# Patient Record
Sex: FEMALE
[De-identification: names, ages, dates, MRNs, and addresses within clinical notes are randomized per-mention and may not be internally consistent; named-entity substitution may affect disease eponyms.]

---

## 2018-04-19 ENCOUNTER — HOSPITAL ENCOUNTER (EMERGENCY)
Dept: HOSPITAL 97 - ER | Age: 68
Discharge: HOME | End: 2018-04-19
Payer: COMMERCIAL

## 2018-04-19 DIAGNOSIS — E78.00: ICD-10-CM

## 2018-04-19 DIAGNOSIS — I10: ICD-10-CM

## 2018-04-19 DIAGNOSIS — J20.9: Primary | ICD-10-CM

## 2018-04-19 PROCEDURE — 71046 X-RAY EXAM CHEST 2 VIEWS: CPT

## 2018-04-19 PROCEDURE — 99284 EMERGENCY DEPT VISIT MOD MDM: CPT

## 2018-04-19 PROCEDURE — 87804 INFLUENZA ASSAY W/OPTIC: CPT

## 2018-04-19 PROCEDURE — 87070 CULTURE OTHR SPECIMN AEROBIC: CPT

## 2018-04-19 PROCEDURE — 87081 CULTURE SCREEN ONLY: CPT

## 2018-04-19 NOTE — RAD REPORT
EXAM DESCRIPTION:  RAD - Chest Pa And Lat (2 Views) - 4/19/2018 6:56 pm

 

CLINICAL HISTORY:  Cough

 

COMPARISON:  10/30/2017

 

FINDINGS:  The lungs are clear. The heart is normal in size. No displaced fractures.

 

IMPRESSION:  No acute or concerning finding suspected.

## 2018-04-19 NOTE — ER
Nurse's Notes                                                                                     

 [facility]                                                                

Name: Ana Goodrich                                                                              

Age: 67 yrs                                                                                       

Sex: Female                                                                                       

: 1950                                                                                   

MRN: [id number]                                                                                   

Arrival Date: 2018                                                                          

Time: 18:02                                                                                       

Account#: [id number]                                                                            

Bed 17                                                                                            

Private MD:                                                                                       

Diagnosis: Acute bronchitis                                                                       

                                                                                                  

Presentation:                                                                                     

                                                                                             

18:09 Presenting complaint: Patient states: Painful cough since . Denies fever.         aj  

      Transition of care: patient was not received from another setting of care. Onset of         

      symptoms was April 15, 2018. Initial Sepsis Screen: Does the patient meet any 2             

      criteria? No. Patient's initial sepsis screen is negative. Does the patient have a          

      suspected source of infection? No. Patient's initial sepsis screen is negative. Care        

      prior to arrival: None.                                                                     

18:09 Method Of Arrival: Ambulatory                                                           aj  

18:09 Acuity: SABINA 4                                                                           aj  

                                                                                                  

Triage Assessment:                                                                                

18:11 General: Appears in no apparent distress. comfortable, Behavior is calm, cooperative,   aj  

      appropriate for age. Pain: Denies pain. Neuro: Level of Consciousness is awake, alert,      

      obeys commands, Oriented to person, place, time, situation, Appropriate for age.            

      Respiratory: Reports pain with cough Airway is patent Respiratory effort is even,           

      unlabored, Respiratory pattern is regular, symmetrical. Derm: Skin is intact, is            

      healthy with good turgor, Skin is pink, warm \T\ dry. normal.                               

                                                                                                  

Historical:                                                                                       

- Allergies:                                                                                      

18:11 No Known Allergies;                                                                     aj  

- Home Meds:                                                                                      

18:11 Atenolol Oral [Active]; hydrochlorothiazide 25 mg Oral tab 1 tab once daily [Active];   aj  

      pravastatin 40 mg Oral tab 1 tab once daily [Active];                                       

- PMHx:                                                                                           

18:11 High Cholesterol; Hypertension;                                                         aj  

- PSHx:                                                                                           

18:11 None;                                                                                   aj  

                                                                                                  

- Immunization history:: Adult Immunizations up to date.                                          

- Social history:: Smoking status: Patient/guardian denies using tobacco.                         

                                                                                                  

                                                                                                  

Screenin:57 Abuse screen: Denies threats or abuse. Denies injuries from another. Nutritional        lk1 

      screening: No deficits noted. Tuberculosis screening: No symptoms or risk factors           

      identified. Fall Risk None identified.                                                      

                                                                                                  

Assessment:                                                                                       

18:56 General: Appears in no apparent distress. Behavior is calm, cooperative, appropriate    lk1 

      for age. Pain: Denies pain. Neuro: Level of Consciousness is awake, alert, obeys            

      commands, Oriented to person, place, time, situation. Cardiovascular: Capillary refill      

      is brisk Patient's skin is warm and dry. Respiratory: Airway is patent Respiratory          

      effort is even, unlabored, Respiratory pattern is regular, symmetrical, Breath sounds       

      are clear bilaterally. Respiratory: the patient has mild shortness of breath                

      Parent/caregiver reports the patient having cough that is dry, hacking, persistent. GI:     

      No signs and/or symptoms were reported involving the gastrointestinal system. : No        

      signs and/or symptoms were reported regarding the genitourinary system. EENT: No signs      

      and/or symptoms were reported regarding the EENT system. Derm: No signs and/or symptoms     

      reported regarding the dermatologic system. Musculoskeletal: No signs and/or symptoms       

      reported regarding the musculoskeletal system.                                              

19:30 Reassessment: Patient and/or family updated on plan of care and expected duration. Pain lk1 

      level reassessed. Patient is alert, oriented x 3, equal unlabored respirations, skin        

      warm/dry/pink. Patient states symptoms have improved. Critical care time stopped,           

      patient has stabilized. Respiratory: Breath sounds are clear bilaterally.                   

                                                                                                  

Vital Signs:                                                                                      

18:11  / 72; Pulse 69; Resp 17; Temp 98.9; Pulse Ox 98% on R/A; Weight 68.04 kg; Height aj  

      5 ft. 2 in. (157.48 cm); Pain 0/10;                                                         

19:30  / 72; Pulse 74; Resp 16; Pulse Ox 99% on R/A;                                    lk1 

18:11 Body Mass Index 27.44 (68.04 kg, 157.48 cm)                                               

                                                                                                  

ED Course:                                                                                        

18:02 Patient arrived in ED.                                                                  mr  

18:11 Triage completed.                                                                       aj  

18:11 Arm band placed on left wrist. Patient placed in an exam room.                          aj  

18:13 Torey Hill PA is PHCP.                                                                cp  

18:13 Torey Cooney MD is Attending Physician.                                             cp  

18:27 Nehal Morales, NAVID is Primary Nurse.                                                       lk1 

18:52 Flu and/or RSV swab sent to lab. Strep swab sent to lab.                                lk1 

18:53 Patient moved to radiology via wheelchair.                                              kc2 

18:54 XRAY Chest Pa And Lat (2 Views) In Process Unspecified.                                 EDMS

18:55 X-ray completed. Patient tolerated procedure well. Patient moved back from radiology.   kc2 

19:03 Patient has correct armband on for positive identification. Bed in low position. Call   lk1 

      light in reach. Adult w/ patient.                                                           

19:03 No provider procedures requiring assistance completed. Patient did not have IV access   lk1 

      during this emergency room visit.                                                           

                                                                                                  

Administered Medications:                                                                         

18:45 Drug: Tessalon Perle 200 mg Route: PO;                                                  lk1 

19:05 Follow up: Response: No adverse reaction; Marked relief of symptoms                     lk1 

18:58 Drug: Albuterol 2.5 mg Route: Inhalation;                                               lk1 

                                                                                                  

                                                                                                  

Outcome:                                                                                          

19:26 Discharge ordered by MD.                                                                cp  

19:45 Discharged to home ambulatory, with family.                                             lk1 

19:45 Condition: stable                                                                           

19:45 Discharge instructions given to patient, family, Instructed on discharge instructions,      

      follow up and referral plans. medication usage, safety practices, Demonstrated              

      understanding of instructions, follow-up care, medications, Prescriptions given X 3.        

19:45 Patient left the ED.                                                                    lk1 

                                                                                                  

Signatures:                                                                                       

Dispatcher [employer]                           EDMS                                                 

Nancy Giles RN RN aj Rivera, Maria mr Page, Corey, PA PA cp Kluge, Leah, RN RN   lk1                                                  

Diamond Ryan2                                                  

                                                                                                  

**************************************************************************************************

## 2018-04-19 NOTE — EDPHYS
Physician Documentation                                                                           

 [facility]                                                                

Name: Ana Goodrich                                                                              

Age: 67 yrs                                                                                       

Sex: Female                                                                                       

: 1950                                                                                   

MRN: [id number]                                                                                   

Arrival Date: 2018                                                                          

Time: 18:02                                                                                       

Account#: [id number]                                                                            

Bed 17                                                                                            

Private MD:                                                                                       

ED Physician Torey Cooney                                                                      

HPI:                                                                                              

                                                                                             

18:25 This 67 yrs old  Female presents to ER via Ambulatory with complaints of Cough. cp  

18:25 The patient or guardian reports cough, that is intermittent.                            cp  

18:25 Onset: The symptoms/episode began/occurred 4 day(s) ago. Severity of symptoms: in the     

      emergency department the symptoms are unchanged, despite home interventions. Associated     

      signs and symptoms: Pertinent negatives: chest pain, diarrhea, fever, vomiting.             

                                                                                                  

Historical:                                                                                       

- Allergies:                                                                                      

18:11 No Known Allergies;                                                                     aj  

- Home Meds:                                                                                      

18:11 Atenolol Oral [Active]; hydrochlorothiazide 25 mg Oral tab 1 tab once daily [Active];   aj  

      pravastatin 40 mg Oral tab 1 tab once daily [Active];                                       

- PMHx:                                                                                           

18:11 High Cholesterol; Hypertension;                                                         aj  

- PSHx:                                                                                           

18:11 None;                                                                                   aj  

                                                                                                  

- Immunization history:: Adult Immunizations up to date.                                          

- Social history:: Smoking status: Patient/guardian denies using tobacco.                         

                                                                                                  

                                                                                                  

ROS:                                                                                              

18:30 Constitutional: Negative for body aches, chills, fever, poor PO intake.                 cp  

18:30 Eyes: Negative for injury, pain, redness, and discharge.                                cp  

18:30 ENT: Positive for sore throat, Negative for drainage from ear(s), ear pain, difficulty  cp  

      swallowing, difficulty handling secretions, hoarseness.                                     

18:30 Cardiovascular: Negative for chest pain, edema, palpitations.                           cp  

18:30 Respiratory: Positive for cough, "sounds productive", Negative for shortness of breath,     

      wheezing.                                                                                   

18:30 Abdomen/GI: Negative for abdominal pain, vomiting, diarrhea, constipation.                  

18:30 Back: Negative for pain at rest, pain with movement, radiated pain.                         

18:30 : Negative for urinary symptoms.                                                          

18:30 Skin: Negative for cellulitis, rash.                                                        

18:30 Neuro: Negative for altered mental status, headache, syncope, near syncope, weakness.       

18:30 All other systems are negative.                                                             

                                                                                                  

Exam:                                                                                             

18:37 Constitutional: The patient appears in no acute distress, alert, awake,                 cp  

      non-diaphoretic, non-toxic, well developed, well nourished.                                 

18:37 Head/Face:  Normocephalic, atraumatic.                                                  cp  

18:37 Eyes: Periorbital structures: appear normal, Pupils: equal, round, and reactive to          

      light and accomodation, Extraocular movements: intact throughout, Conjunctiva: normal,      

      no exudate, no injection, Sclera: no appreciated abnormality, Lids and lashes: appear       

      normal, bilaterally.                                                                        

18:37 ENT: External ear(s): are unremarkable, Ear canal(s): are normal, clear, TM's: bulging,     

      is not appreciated, bilaterally, dullness, bilaterally, erythema, is not appreciated,       

      bilaterally, Nose: is normal, Mouth: Lips: moist, Oral mucosa: moist, Posterior             

      pharynx: Airway: no evidence of obstruction, patent, Tonsils: are normal in appearance,     

      Uvula: midline, swelling, is not appreciated, erythema, that is mild, exudate, is not       

      appreciated.                                                                                

18:37 Neck: ROM/movement: is normal, is supple, without pain, no range of motions                 

      limitations, no meningismus, no nuchal rigidity, Lymph nodes: no appreciated                

      lymphadenopathy.                                                                            

18:37 Chest/axilla: Inspection: normal, Palpation: is normal, no crepitus, no tenderness.         

18:37 Cardiovascular: Rate: normal, Rhythm: regular, Edema: is not appreciated, JVD: is not       

      appreciated.                                                                                

18:37 Respiratory: the patient does not display signs of respiratory distress,  Respirations:     

      normal, no use of accessory muscles, no retractions, no splinting, no tachypnea,            

      labored breathing, is not present, Breath sounds: bronchial sounds, that are mild, are      

      heard diffusely, decreased breath sounds, are not appreciated, stridor, is not              

      appreciated, wheezing: is not appreciated.                                                  

18:37 Abdomen/GI: Exam negative for discomfort, distension, guarding, Inspection: abdomen         

      appears normal.                                                                             

18:37 Skin: cellulitis, is not appreciated, no rash present.                                      

                                                                                                  

Vital Signs:                                                                                      

18:11  / 72; Pulse 69; Resp 17; Temp 98.9; Pulse Ox 98% on R/A; Weight 68.04 kg; Height aj  

      5 ft. 2 in. (157.48 cm); Pain 0/10;                                                         

19:30  / 72; Pulse 74; Resp 16; Pulse Ox 99% on R/A;                                    lk1 

18:11 Body Mass Index 27.44 (68.04 kg, 157.48 cm)                                             aj  

                                                                                                  

MDM:                                                                                              

18:13 Patient medically screened.                                                             art 

19:00 Differential Diagnosis: Bronchitis Influenza Upper Respiratory Infection Pneumonia.     cp  

19:20 Data reviewed: vital signs, nurses notes, lab test result(s), radiologic studies, plain cp  

      films.                                                                                      

19:20 Test interpretation: by ED physician or midlevel provider: plain radiologic studies.    cp  

19:25 Counseling: I had a detailed discussion with the patient and/or guardian regarding: the cp  

      historical points, exam findings, and any diagnostic results supporting the                 

      discharge/admit diagnosis, lab results, radiology results, the need for outpatient          

      follow up, a family practitioner, to return to the emergency department if symptoms         

      worsen or persist or if there are any questions or concerns that arise at home.             

19:25 Response to treatment: the patient's symptoms have mildly improved after treatment, and cp  

      as a result, I will discharge patient.                                                      

                                                                                                  

                                                                                             

18:24 Order name: Influenza Screen (a \T\ B)                                                    

                                                                                             

18:24 Order name: Strep                                                                         

                                                                                             

18:24 Order name: XRAY Chest Pa And Lat (2 Views); Complete Time: 19:14                       cp  

                                                                                             

19:14 Interpretation: Report reviewed.                                                        cp  

                                                                                                  

Administered Medications:                                                                         

18:45 Drug: Tessalon Perle 200 mg Route: PO;                                                  lk1 

19:05 Follow up: Response: No adverse reaction; Marked relief of symptoms                     lk1 

18:58 Drug: Albuterol 2.5 mg Route: Inhalation;                                               lk1 

                                                                                                  

                                                                                                  

Disposition:                                                                                      

18 19:26 Discharged to Home. Impression: Acute bronchitis.                                  

- Condition is Stable.                                                                            

- Discharge Instructions: Acute Bronchitis.                                                       

- Prescriptions for Prednisone 20 mg Oral Tablet - take 1 tablet by ORAL route once               

  daily for 5 days; 5 tablet. Tessalon Perles 100 mg Oral Capsule - take 1 capsule by             

  ORAL route every 8 hours As needed; 15 capsule. Albuterol Sulfate 90 mcg/actuation -            

  inhale 1-2 puff by INHALATION route every 4-6 hours; 1 Inhaler.                                 

- Medication Reconciliation Form, Thank You Letter, Antibiotic Education, Prescription            

  Opioid Use form.                                                                                

- Follow up: Private Physician; When: 2 - 3 days; Reason: Recheck today's complaints.             

- Problem is new.                                                                                 

- Symptoms have improved.                                                                         

                                                                                                  

                                                                                                  

                                                                                                  

Addendum:                                                                                         

2018                                                                                        

     07:23 Co-signature as Attending Physician, Torey Cooney MD I agree with the assessment and  c
ha

           plan of care.                                                                          

                                                                                                  

Signatures:                                                                                       

Dispatcher [employer]                           Nancy Barrios, RN                       RN   Torey Giraldo MD MD cha Page, Corey, PA PA cp Kluge, Leah, RN                         RN   lk1                                                  

                                                                                                  

**************************************************************************************************

## 2021-06-02 ENCOUNTER — HOSPITAL ENCOUNTER (EMERGENCY)
Dept: HOSPITAL 97 - ER | Age: 71
Discharge: HOME | End: 2021-06-02
Payer: COMMERCIAL

## 2021-06-02 VITALS — DIASTOLIC BLOOD PRESSURE: 78 MMHG | OXYGEN SATURATION: 98 % | SYSTOLIC BLOOD PRESSURE: 193 MMHG

## 2021-06-02 DIAGNOSIS — I10: ICD-10-CM

## 2021-06-02 DIAGNOSIS — H11.31: Primary | ICD-10-CM

## 2021-06-02 DIAGNOSIS — E78.00: ICD-10-CM

## 2021-06-02 PROCEDURE — 99281 EMR DPT VST MAYX REQ PHY/QHP: CPT

## 2021-06-02 NOTE — ER
Nurse's Notes                                                                                     

 [facility]                                                                 

Name: Ana Goodrich                                                                              

Age: 70 yrs                                                                                       

Sex: Female                                                                                       

: 1950                                                                                   

MRN: [id number]                                                                                   

Arrival Date: 2021                                                                          

Time: 18:46                                                                                       

Account#: [id number]                                                                            

Bed Treatment                                                                                     

Private MD:                                                                                       

Diagnosis: Conjunctival hemorrhage, right eye                                                     

                                                                                                  

Presentation:                                                                                     

                                                                                             

19:24 Chief complaint: Patient states: redness to right eye since 11 am, no trauma , no       iw  

      vision loss, is not painful. Coronavirus screen: At this time, the client does not          

      indicate any symptoms associated with coronavirus-19. Ebola Screen: Patient negative        

      for fever greater than or equal to 101.5 degrees Fahrenheit, and additional compatible      

      Ebola Virus Disease symptoms Patient denies exposure to infectious person. Patient          

      denies travel to an Ebola-affected area in the 21 days before illness onset. No             

      symptoms or risks identified at this time. Initial Sepsis Screen: Does the patient meet     

      any 2 criteria? No. Patient's initial sepsis screen is negative. Does the patient have      

      a suspected source of infection? No. Patient's initial sepsis screen is negative. Risk      

      Assessment: Do you want to hurt yourself or someone else? Patient reports no desire to      

      harm self or others. Onset of symptoms was 2021.                                   

19:24 Method Of Arrival: Ambulatory                                                           iw  

19:24 Acuity: SABINA 3                                                                           iw  

                                                                                                  

Historical:                                                                                       

- Allergies:                                                                                      

19:28 No Known Allergies;                                                                     iw  

- Home Meds:                                                                                      

19:28 losartan-hydrochlorothiazide 50-12.5 mg oral tab 1 tab once daily [Active];             iw  

- PMHx:                                                                                           

19:28 High Cholesterol; Hypertension;                                                         iw  

                                                                                                  

- Family history:: not pertinent.                                                                 

- Hospitalizations: : No recent hospitalization is reported.                                      

                                                                                                  

                                                                                                  

Vital Signs:                                                                                      

19:24  / 78; Pulse 84; Resp 16; Pulse Ox 98% on R/A;                                    iw  

                                                                                                  

ED Course:                                                                                        

18:46 Patient arrived in ED.                                                                  as  

19:27 Triage completed.                                                                       iw  

19:59 Barb Chery RN is Primary Nurse.                                                   iw  

19:59 Arm band placed on.                                                                     iw  

20:12 Stanislav Pérez MD is Attending Physician.                                                rn  

                                                                                                  

Administered Medications:                                                                         

No medications were administered                                                                  

                                                                                                  

                                                                                                  

Outcome:                                                                                          

20:23 Discharge ordered by MD.                                                                rn  

20:32 Patient left the ED.                                                                    iw  

                                                                                                  

Signatures:                                                                                       

Heather Foster                             as                                                   

Barb Chery RN                     RN   iw                                                   

Stanislav Pérez MD MD   rn                                                   

                                                                                                  

Corrections: (The following items were deleted from the chart)                                    

19:59 19:24 Acuity: SABINA 4 iw                                                                  iw  

                                                                                                  

**************************************************************************************************

## 2021-06-02 NOTE — EDPHYS
Physician Documentation                                                                           

 [facility]                                                                 

Name: Ana Goodrich                                                                              

Age: 70 yrs                                                                                       

Sex: Female                                                                                       

: 1950                                                                                   

MRN: [id number]                                                                                   

Arrival Date: 2021                                                                          

Time: 18:46                                                                                       

Account#: [id number]                                                                            

Bed Treatment                                                                                     

Private MD:                                                                                       

ED Physician Stanislav Pérez                                                                         

HPI:                                                                                              

                                                                                             

20:19 This 70 yrs old  Female presents to ER via Ambulatory with complaints of        rn  

      Redness of Eye, High Blood Pressure.                                                        

20:19 The patient is experiencing redness, The patient sustained None. to the right eye,      rn  

      caused by an unknown mechanism. Onset: The symptoms/episode began/occurred this             

      morning. Duration: the symptoms are continuous. Aggravated by nothing. Alleviated by        

      nothing. Severity of symptoms: At their worst the symptoms were mild in the emergency       

      department the symptoms are unchanged. The patient has not experienced similar symptoms     

      in the past. The patient has not recently seen a physician. Woke up today with redness      

      of white of right eye, denies trauma or splash injury, no pain, no vision changes, no       

      headache, daughter checked BP and was elevated so brought her in. No other complaints. .    

                                                                                                  

Historical:                                                                                       

- Allergies:                                                                                      

19:28 No Known Allergies;                                                                     iw  

- Home Meds:                                                                                      

19:28 losartan-hydrochlorothiazide 50-12.5 mg oral tab 1 tab once daily [Active];             iw  

- PMHx:                                                                                           

19:28 High Cholesterol; Hypertension;                                                         iw  

                                                                                                  

- Family history:: not pertinent.                                                                 

- Hospitalizations: : No recent hospitalization is reported.                                      

                                                                                                  

                                                                                                  

ROS:                                                                                              

20:19 Constitutional: Negative for fever, chills, and weight loss, Eyes: Negative for injury, rn  

      pain, and discharge, Neuro: Negative for headache, weakness, numbness, tingling, and        

      seizure.                                                                                    

                                                                                                  

Exam:                                                                                             

20:19 Constitutional:  This is a well developed, well nourished patient who is awake, alert,  rn  

      and in no acute distress. Head/Face:  Normocephalic, atraumatic. Eyes:  Pupils equal        

      round and reactive to light, extra-ocular motions intact.  Lids and lashes normal.          

      Cornea within normal limits.  Periorbital areas with no swelling, redness, or edema.  +     

      subconjunctival hemorrhage right eye. No foreign body identified. Neuro:  Awake and         

      alert, GCS 15, oriented to person, place, time, and situation.  Cranial nerves II-XII       

      grossly intact.  Motor strength 5/5 in all extremities.  Sensory grossly intact.            

      Cerebellar exam normal.  Normal gait.                                                       

                                                                                                  

Vital Signs:                                                                                      

19:24  / 78; Pulse 84; Resp 16; Pulse Ox 98% on R/A;                                    iw  

                                                                                                  

MDM:                                                                                              

20:12 Patient medically screened.                                                             rn  

20:19 Differential diagnosis: Subconjunctival hemorrhage. Data reviewed: vital signs, nurses  rn  

      notes, and as a result, I will discharge patient. Counseling: I had a detailed              

      discussion with the patient and/or guardian regarding: the historical points, exam          

      findings, and any diagnostic results supporting the discharge/admit diagnosis, the need     

      for outpatient follow up, to return to the emergency department if symptoms worsen or       

      persist or if there are any questions or concerns that arise at home. Counseling: I had     

      a detailed discussion with the patient and/or guardian regarding: the presence of at        

      least one elevated blood pressure reading (>120/80) during this emergency department        

      visit. Medical screen evaluation completed. [facility] emergency medical condition absent.      

      Special discussion: I have referred the patient to see his PCP for further evaluation       

      of high blood pressure. I discussed with the patient/guardian in detail that at this        

      point there is no indication for admission to the hospital. It is understood, however,      

      that if the symptoms persist or worsen the patient needs to return immediately for          

      re-evaluation. Based on the history and exam findings, there is no indication for           

      further emergent testing or inpatient evaluation. I discussed with the patient/guardian     

      the need to see the primary care provider for further evaluation of the symptoms.           

                                                                                                  

Administered Medications:                                                                         

No medications were administered                                                                  

                                                                                                  

                                                                                                  

Disposition:                                                                                      

21 20:23 Discharged to Home. Impression: Conjunctival hemorrhage, right eye.                

- Condition is Stable.                                                                            

- Discharge Instructions: Subconjunctival Hemorrhage.                                             

                                                                                                  

- Medication Reconciliation Form, Thank You Letter, Antibiotic Education, Prescription            

  Opioid Use form.                                                                                

- Follow up: Private Physician; When: As needed; Reason: Recheck today's complaints,              

  Re-evaluation by your physician.                                                                

- Problem is new.                                                                                 

- Symptoms are unchanged.                                                                         

                                                                                                  

                                                                                                  

                                                                                                  

Signatures:                                                                                       

Barb Chery RN                     RN   iw                                                   

Stanislav Pérez MD MD   rn                                                   

                                                                                                  

Corrections: (The following items were deleted from the chart)                                    

20:32 20:23 2021 20:23 Discharged to Home. Impression: Conjunctival hemorrhage, right   iw  

      eye. Condition is Stable. Forms are Medication Reconciliation Form, Thank You Letter,       

      Antibiotic Education, Prescription Opioid Use. Follow up: Private Physician; When: As       

      needed; Reason: Recheck today's complaints, Re-evaluation by your physician. Problem is     

      new. Symptoms are unchanged. rn                                                             

                                                                                                  

**************************************************************************************************

## 2021-10-12 ENCOUNTER — HOSPITAL ENCOUNTER (EMERGENCY)
Dept: HOSPITAL 97 - ER | Age: 71
Discharge: HOME | End: 2021-10-12
Payer: COMMERCIAL

## 2021-10-12 VITALS — DIASTOLIC BLOOD PRESSURE: 100 MMHG | SYSTOLIC BLOOD PRESSURE: 180 MMHG | OXYGEN SATURATION: 99 % | TEMPERATURE: 99 F

## 2021-10-12 DIAGNOSIS — E78.00: ICD-10-CM

## 2021-10-12 DIAGNOSIS — N39.0: Primary | ICD-10-CM

## 2021-10-12 DIAGNOSIS — I10: ICD-10-CM

## 2021-10-12 PROCEDURE — 99283 EMERGENCY DEPT VISIT LOW MDM: CPT

## 2021-10-12 PROCEDURE — 81003 URINALYSIS AUTO W/O SCOPE: CPT

## 2021-10-12 PROCEDURE — 87086 URINE CULTURE/COLONY COUNT: CPT

## 2021-10-12 PROCEDURE — 87088 URINE BACTERIA CULTURE: CPT

## 2021-10-12 NOTE — EDPHYS
Physician Documentation                                                                           

 [facility]                                                                 

Name: Ana Goodrich                                                                              

Age: 71 yrs                                                                                       

Sex: Female                                                                                       

: 1950                                                                                   

MRN: [id number]                                                                                   

Arrival Date: 10/12/2021                                                                          

Time: 07:08                                                                                       

Account#: [id number]                                                                            

Bed 14                                                                                            

Private MD:                                                                                       

PILAR Physician Torey Cooney                                                                      

HPI:                                                                                              

10/12                                                                                             

07:22 This 71 yrs old  Female presents to ER via Ambulatory with complaints of        jmm 

      Abdominal Pain, Blood in Urine.                                                             

07:22 The patient presents with abdominal pain suprapubic. Onset: The symptoms/episode        jmm 

      began/occurred gradually, 1 day(s) ago. The symptoms do not radiate. Associated signs       

      and symptoms: Pertinent positives: dysuria, hematuria, Pertinent negatives: nausea and      

      vomiting, chest pain, diarrhea, fever, vaginal discharge, vomiting. The symptoms are        

      described as achy. Modifying factors: The symptoms are alleviated by nothing, the           

      symptoms are aggravated by urinating. It is unknown whether or not the patient has had      

      similar symptoms in the past.                                                               

                                                                                                  

Historical:                                                                                       

- Home Meds:                                                                                      

07:20 hydrochlorothiazide 25 mg Oral tab 1 tab once daily [Active];                           es2 

      losartan-hydrochlorothiazide 50-12.5 mg Oral tab 1 tab once daily [Active]; pravastatin     

      40 mg Oral tab 1 tab once daily [Active];                                                   

- PMHx:                                                                                           

07:20 High Cholesterol; Hypertension;                                                         es2 

                                                                                                  

- Immunization history:: Client reports receiving the 2nd dose of the Covid vaccine.              

- Social history:: Smoking status: Patient denies any tobacco usage or history of.                

                                                                                                  

                                                                                                  

ROS:                                                                                              

07:22 Constitutional: Negative for fever, chills, and weight loss, Cardiovascular: Negative   jmm 

      for chest pain, palpitations, and edema, Respiratory: Negative for shortness of breath,     

      cough, wheezing, and pleuritic chest pain.                                                  

07:22 Abdomen/GI: Positive for abdominal pain.                                                    

07:22 : Positive for urinary symptoms, hematuria, burning with urination.                       

07:22 All other systems are negative.                                                             

                                                                                                  

Exam:                                                                                             

07:22 Constitutional:  This is a well developed, well nourished patient who is awake, alert,  jmm 

      and in no acute distress. Head/Face:  atraumatic. Eyes:  EOMI, no conjunctival erythema     

      appreciated ENT:  Moist Mucus Membranes Neck:  Trachea midline, Supple Chest/axilla:        

      Normal chest wall appearance and motion.   Cardiovascular:  Regular rate and rhythm.        

      No edema appreciated Respiratory:  Normal respirations, no respiratory distress             

      appreciated                                                                                 

07:22 Skin:  General appearance color normal MS/ Extremity:  Moves all extremities, no            

      obvious deformities appreciated, no edema noted to the lower extremities  Neuro:  Awake     

      and alert, normal gait Psych:  Behavior is normal, Mood is normal, Patient is               

      cooperative and pleasant                                                                    

07:22 Abdomen/GI: Inspection: abdomen appears normal, Bowel sounds: normal, Palpation: soft,      

      mild abdominal tenderness, in the suprapubic area.                                          

                                                                                                  

Vital Signs:                                                                                      

07:23  / 100; Pulse 104; Resp 18; Temp 99; Pulse Ox 99% on R/A;                         es2 

                                                                                                  

MDM:                                                                                              

07:22 Patient medically screened.                                                             [facility] 

07:32 Data reviewed: vital signs, nurses notes. Counseling: I had a detailed discussion with  jagdish 

      the patient and/or guardian regarding: the historical points, exam findings, and any        

      diagnostic results supporting the discharge/admit diagnosis, lab results, the need for      

      outpatient follow up, to return to the emergency department if symptoms worsen or           

      persist or if there are any questions or concerns that arise at home. ED course:            

      Patient is alert and nontoxic in appearance in the ED. No flank pain, vomiting. I do        

      not currently suspect calculus of ureter. Patient will be treated with oral                 

      antibiotics. I do not currently suspect appendicitis or diverticulitis. Patient is          

      otherwise given strict return precautions. Patient understood and agrees to plan of         

      care..                                                                                      

                                                                                                  

10/12                                                                                             

07:22 Order name: Urine Culture                                                               [facility] 

10/12                                                                                             

07:22 Order name: Urine Culture                                                               [facility]

10/12                                                                                             

07:22 Order name: Urine Dipstick-Ancillary (obtain specimen); Complete Time: 07:40            [facility] 

10/12                                                                                             

07:25 Order name: Urine Dipstick-Ancillary; Complete Time: 07:29                              EDMS

                                                                                                  

Administered Medications:                                                                         

No medications were administered                                                                  

                                                                                                  

                                                                                                  

Disposition:                                                                                      

08:32 Co-signature as Attending Physician, Torey Cooney MD I agree with the assessment and  art 

      plan of care.                                                                               

                                                                                                  

Disposition Summary:                                                                              

10/12/21 07:34                                                                                    

Discharge Ordered                                                                                 

      Location: Home                                                                          [facility] 

      Condition: Stable                                                                       [facility] 

      Diagnosis                                                                                   

        - UTI/ Urinary tract infection, site not specified                                    [facility] 

      Followup:                                                                               jagdish 

        - With: Private Physician                                                                  

        - When: 2 - 3 days                                                                         

        - Reason: Recheck today's complaints, Continuance of care, Re-evaluation by your           

      physician                                                                                   

      Discharge Instructions:                                                                     

        - Discharge Summary Sheet                                                             [facility] 

        - Urinary Tract Infection, Adult                                                      [facility] 

      Forms:                                                                                      

        - Medication Reconciliation Form                                                      jmm 

        - Thank You Letter                                                                    jmchance 

        - Antibiotic Education                                                                [facility] 

        - Prescription Opioid Use                                                             [facility] 

      Prescriptions:                                                                              

        - Cipro 500 mg Oral Tablet                                                                 

            - take 1 tablet by ORAL route every 12 hours for 7 days; 20 tablet; Refills: 0,   jmm 

      Product Selection Permitted                                                                 

Signatures:                                                                                       

Dispatcher [employer]                           Torey Moreno MD MD cha Mickail, Joel, PA PA jmm Smith, Elizabeth RN                    RN   es2                                                  

                                                                                                  

**************************************************************************************************

## 2021-10-12 NOTE — ER
Nurse's Notes                                                                                     

 [facility]                                                                 

Name: Ana Goodrich                                                                              

Age: 71 yrs                                                                                       

Sex: Female                                                                                       

: 1950                                                                                   

MRN: [id number]                                                                                   

Arrival Date: 10/12/2021                                                                          

Time: 07:08                                                                                       

Account#: [id number]                                                                            

Bed 14                                                                                            

Private MD:                                                                                       

Diagnosis: UTI/ Urinary tract infection, site not specified                                       

                                                                                                  

Presentation:                                                                                     

10/12                                                                                             

07:15 Chief complaint: Patient states: suprapubic discomfort and blood in urine that began    ss  

      yesterday.                                                                                  

07:15 Method Of Arrival: Ambulatory                                                           ss  

07:23 Coronavirus screen: Vaccine status: Patient reports receiving the 2nd dose of the covid es2 

      vaccine. Ebola Screen: Patient negative for fever greater than or equal to 101.5            

      degrees Fahrenheit, and additional compatible Ebola Virus Disease symptoms Patient          

      denies exposure to infectious person. Patient denies travel to an Ebola-affected area       

      in the 21 days before illness onset. No symptoms or risks identified at this time.          

      Initial Sepsis Screen: Does the patient meet any 2 criteria? No. Patient's initial          

      sepsis screen is negative. Does the patient have a suspected source of infection? No.       

      Patient's initial sepsis screen is negative. Risk Assessment: Do you want to hurt           

      yourself or someone else? Patient reports no desire to harm self or others. Onset of        

      symptoms was 2021.                                                              

07:23 Acuity: SABINA 3                                                                           es2 

                                                                                                  

Triage Assessment:                                                                                

07:21 General: Appears well groomed, well developed, Behavior is calm, cooperative,           es2 

      appropriate for age. Pain: Complains of pain in abdomen Pain currently is 9 out of 10       

      on a pain scale. EENT: No signs and/or symptoms were reported regarding the EENT            

      system. Neuro: Level of Consciousness is awake, alert, obeys commands, Oriented to          

      person, place, time, situation, Appropriate for age Gait is steady, Speech is normal.       

      Cardiovascular: Capillary refill < 3 seconds Patient's skin is warm and dry.                

      Respiratory: Airway is patent Respiratory effort is even, Respiratory pattern is            

      regular. GI: Abd is soft Abd is non tender. : Reports burning with urination, since       

      yesterday. Derm: No signs and/or symptoms reported regarding the dermatologic system.       

      Musculoskeletal: No signs and/or symptoms reported regarding the musculoskeletal system.    

                                                                                                  

Historical:                                                                                       

- Home Meds:                                                                                      

07:20 hydrochlorothiazide 25 mg Oral tab 1 tab once daily [Active];                           es2 

      losartan-hydrochlorothiazide 50-12.5 mg Oral tab 1 tab once daily [Active]; pravastatin     

      40 mg Oral tab 1 tab once daily [Active];                                                   

- PMHx:                                                                                           

07:20 High Cholesterol; Hypertension;                                                         es2 

                                                                                                  

- Immunization history:: Client reports receiving the 2nd dose of the Covid vaccine.              

- Social history:: Smoking status: Patient denies any tobacco usage or history of.                

                                                                                                  

                                                                                                  

Screenin:24 Abuse screen: Denies threats or abuse. Denies injuries from another. Nutritional        es2 

      screening: No deficits noted. Tuberculosis screening: No symptoms or risk factors           

      identified. Fall Risk Gait- Normal/Bed Rest/Wheelchair (0 pts) Mental Status- Oriented      

      to own ability (0 pts).                                                                     

                                                                                                  

Assessment:                                                                                       

07:26 Reassessment: Patient is alert, oriented x 3, equal unlabored respirations, skin        es2 

      warm/dry/pink. Pt in for lower abd, suprapubic pain that started yesterday with painful     

      urination, and blood in urine. GI: Bowel sounds present X 4 quads.                          

                                                                                                  

Vital Signs:                                                                                      

07:23  / 100; Pulse 104; Resp 18; Temp 99; Pulse Ox 99% on R/A;                         es2 

                                                                                                  

ED Course:                                                                                        

07:08 Patient arrived in ED.                                                                  bp1 

07:14 Abbey Damico, RN is Primary Nurse.                                                  es2 

07:16 Tony Moody PA is PHCP.                                                              jmm 

07:16 Torey Cooney MD is Attending Physician.                                             jmm 

07:24 Triage completed.                                                                       es2 

07:25 No provider procedures requiring assistance completed.                                  es2 

07:25 Patient has correct armband on for positive identification. Placed in gown. Bed in low  es2 

      position. Call light in reach. Side rails up X 1.                                           

07:25 Arm band placed on.                                                                     es2 

07:40 Urine Culture Sent.                                                                     es2 

07:40 Urine Culture Sent.                                                                     es2 

07:48 Patient did not have IV access during this emergency room visit.                        es2 

                                                                                                  

Administered Medications:                                                                         

No medications were administered                                                                  

                                                                                                  

                                                                                                  

Outcome:                                                                                          

07:34 Discharge ordered by MD.                                                                [facility] 

07:48 Discharged to home ambulatory.                                                          es2 

07:48 Condition: stable                                                                           

07:48 Discharge instructions given to patient, family, Instructed on discharge instructions,      

      medication usage, Demonstrated understanding of instructions, medications,                  

      Prescriptions given X 1.                                                                    

07:48 Patient left the ED.                                                                    es2 

                                                                                                  

Signatures:                                                                                       

Tony Moody PA PA jmm Smirch, Shelby, NAVID                      RN   ss                                                   

Tabitha Kirkpatrick Elizabeth, RN                    RN   es2                                                  

                                                                                                  

**************************************************************************************************